# Patient Record
Sex: MALE | URBAN - METROPOLITAN AREA
[De-identification: names, ages, dates, MRNs, and addresses within clinical notes are randomized per-mention and may not be internally consistent; named-entity substitution may affect disease eponyms.]

---

## 2017-01-09 ENCOUNTER — EMERGENCY (EMERGENCY)
Facility: HOSPITAL | Age: 47
LOS: 1 days | Discharge: HOME | End: 2017-01-09
Attending: EMERGENCY MEDICINE
Payer: COMMERCIAL

## 2017-01-09 VITALS
RESPIRATION RATE: 22 BRPM | TEMPERATURE: 98 F | HEIGHT: 68 IN | OXYGEN SATURATION: 97 % | WEIGHT: 259.93 LBS | SYSTOLIC BLOOD PRESSURE: 112 MMHG | DIASTOLIC BLOOD PRESSURE: 74 MMHG | HEART RATE: 102 BPM

## 2017-01-09 VITALS
SYSTOLIC BLOOD PRESSURE: 109 MMHG | DIASTOLIC BLOOD PRESSURE: 60 MMHG | HEART RATE: 94 BPM | TEMPERATURE: 99 F | OXYGEN SATURATION: 100 % | RESPIRATION RATE: 18 BRPM

## 2017-01-09 DIAGNOSIS — R05 COUGH: ICD-10-CM

## 2017-01-09 DIAGNOSIS — Z85.05 PERSONAL HISTORY OF MALIGNANT NEOPLASM OF LIVER: ICD-10-CM

## 2017-01-09 DIAGNOSIS — J45.909 UNSPECIFIED ASTHMA, UNCOMPLICATED: ICD-10-CM

## 2017-01-09 PROCEDURE — 71046 X-RAY EXAM CHEST 2 VIEWS: CPT

## 2017-01-09 PROCEDURE — 71020: CPT | Mod: 26

## 2017-01-09 PROCEDURE — 99284 EMERGENCY DEPT VISIT MOD MDM: CPT

## 2017-01-09 PROCEDURE — 94640 AIRWAY INHALATION TREATMENT: CPT

## 2017-01-09 PROCEDURE — 99283 EMERGENCY DEPT VISIT LOW MDM: CPT | Mod: 25

## 2017-01-09 RX ORDER — IBUPROFEN 200 MG
600 TABLET ORAL ONCE
Qty: 0 | Refills: 0 | Status: COMPLETED | OUTPATIENT
Start: 2017-01-09 | End: 2017-01-09

## 2017-01-09 RX ORDER — IPRATROPIUM/ALBUTEROL SULFATE 18-103MCG
3 AEROSOL WITH ADAPTER (GRAM) INHALATION ONCE
Qty: 0 | Refills: 0 | Status: COMPLETED | OUTPATIENT
Start: 2017-01-09 | End: 2017-01-09

## 2017-01-09 RX ADMIN — Medication 600 MILLIGRAM(S): at 20:00

## 2017-01-09 RX ADMIN — Medication 600 MILLIGRAM(S): at 21:08

## 2017-01-09 RX ADMIN — Medication 3 MILLILITER(S): at 20:10

## 2017-01-09 RX ADMIN — Medication 60 MILLIGRAM(S): at 20:24

## 2017-01-09 NOTE — ED PROVIDER NOTE - NS ED MD SCRIBE ATTENDING SCRIBE SECTIONS
PAST MEDICAL/SURGICAL/SOCIAL HISTORY/VITAL SIGNS( Pullset)/HISTORY OF PRESENT ILLNESS/DISPOSITION/PHYSICAL EXAM/REVIEW OF SYSTEMS/HIV

## 2017-01-09 NOTE — ED ADULT NURSE NOTE - OBJECTIVE STATEMENT
Patient complains of wheezing, cough, left sided back pain x 3 days. Bilateral wheezing upon auscultation. Speaking in full sentences, no use of accessory muscles. Skin is warm and dry. In no acute respiratory distress.

## 2017-01-09 NOTE — ED PROVIDER NOTE - MEDICAL DECISION MAKING DETAILS
45 y/o M pt presents to the ED with 2 days cough. Pt saw PMD and was started on Levaquin with no relief. Plan to obtain CXR to r/o PNA.

## 2017-01-09 NOTE — ED PROVIDER NOTE - OBJECTIVE STATEMENT
45 y/o M pt with PMHx of liver CA presents to the ED c/o difficulty breathing x couple of days. Pt also notes chest pain with deep breathes, wheezing and cough. Pt reports taking Robitussin for cough and receiving albuterol in the ambulance. Pt saw PMD who started him on Levaquin with no relief.  Pt  denies fever, chills or any other complaints at this time. NKDA. PMD: Nicole Croft 47 y/o M pt with PMHx of liver CA presents to the ED c/o difficulty breathing x couple of days. Pt also notes chest pain with deep breathes, wheezing and cough. Pt reports taking Robitussin for cough and receiving albuterol in the ambulance. Pt saw PMD who started him on Levaquin today.  Pt  denies fever, chills or any other complaints at this time. NKDA. PMD: Nicole Croft

## 2022-02-28 NOTE — ED ADULT NURSE NOTE - NS ED NURSE DC INFO COMPLEXITY
Skin normal color for race, warm, dry and intact. No evidence of rash. Simple: Patient demonstrates quick and easy understanding